# Patient Record
Sex: MALE | Race: WHITE | NOT HISPANIC OR LATINO | Employment: OTHER | URBAN - METROPOLITAN AREA
[De-identification: names, ages, dates, MRNs, and addresses within clinical notes are randomized per-mention and may not be internally consistent; named-entity substitution may affect disease eponyms.]

---

## 2018-03-26 ENCOUNTER — LAB VISIT (OUTPATIENT)
Dept: LAB | Facility: HOSPITAL | Age: 78
End: 2018-03-26
Attending: PSYCHIATRY & NEUROLOGY
Payer: MEDICARE

## 2018-03-26 ENCOUNTER — OFFICE VISIT (OUTPATIENT)
Dept: NEUROLOGY | Facility: CLINIC | Age: 78
End: 2018-03-26
Payer: MEDICARE

## 2018-03-26 VITALS
HEART RATE: 72 BPM | SYSTOLIC BLOOD PRESSURE: 132 MMHG | RESPIRATION RATE: 18 BRPM | WEIGHT: 182.31 LBS | DIASTOLIC BLOOD PRESSURE: 68 MMHG | BODY MASS INDEX: 27.63 KG/M2 | HEIGHT: 68 IN

## 2018-03-26 DIAGNOSIS — E23.6 EMPTY SELLA: ICD-10-CM

## 2018-03-26 DIAGNOSIS — R42 VERTIGO: ICD-10-CM

## 2018-03-26 DIAGNOSIS — I45.10 RIGHT BUNDLE BRANCH BLOCK: ICD-10-CM

## 2018-03-26 DIAGNOSIS — R09.89 UNEQUAL BLOOD PRESSURE IN UPPER EXTREMITIES: ICD-10-CM

## 2018-03-26 DIAGNOSIS — R09.89 UNEQUAL BLOOD PRESSURE IN UPPER EXTREMITIES: Primary | ICD-10-CM

## 2018-03-26 LAB
CREAT SERPL-MCNC: 1.2 MG/DL
EST. GFR  (AFRICAN AMERICAN): >60 ML/MIN/1.73 M^2
EST. GFR  (NON AFRICAN AMERICAN): 59 ML/MIN/1.73 M^2

## 2018-03-26 PROCEDURE — 82565 ASSAY OF CREATININE: CPT

## 2018-03-26 PROCEDURE — 99999 PR PBB SHADOW E&M-EST. PATIENT-LVL III: CPT | Mod: PBBFAC,,, | Performed by: PSYCHIATRY & NEUROLOGY

## 2018-03-26 PROCEDURE — 36415 COLL VENOUS BLD VENIPUNCTURE: CPT

## 2018-03-26 PROCEDURE — 99204 OFFICE O/P NEW MOD 45 MIN: CPT | Mod: S$PBB,,, | Performed by: PSYCHIATRY & NEUROLOGY

## 2018-03-26 RX ORDER — FENOFIBRATE 160 MG/1
160 TABLET ORAL DAILY
COMMUNITY

## 2018-03-26 RX ORDER — MECLIZINE HYDROCHLORIDE 25 MG/1
25 TABLET ORAL 4 TIMES DAILY PRN
COMMUNITY

## 2018-03-26 NOTE — LETTER
March 26, 2018      Sunny Fritz MD  [address]  [facility]  [place] [address]           [facility]  [address] [facility] [address]  Phone: [phone]  Fax: [phone]          Patient: Justin Mcnally   MR Number: [id number]   YOB: 1940   Date of Visit: 3/26/2018       Dear Dr. Sunny Fritz:    Thank you for referring Justin Mcnally to me for evaluation. Attached you will find relevant portions of my assessment and plan of care.    If you have questions, please do not hesitate to call me. I look forward to following Justin Mcnally along with you.    Sincerely,    Ventura Prescott MD    Enclosure  CC:  No Recipients    If you would like to receive this communication electronically, please contact [email or web address] or [phone] to request more information on [employer] Link access.    For providers and/or their staff who would like to refer a patient to [facility], please contact us through our one-stop-shop provider referral line, [facility], at [phone].    If you feel you have received this communication in error or would no longer like to receive these types of communications, please e-mail [email or web address]

## 2018-03-26 NOTE — PROGRESS NOTES
"Consult from Dr Fritz    HPI: Justin Mcnally is a 77 y.o. male referred for abnormal MRI brain. He was noted to have empty sella syndrome  Note in 2/2018 he reported to the ER at [facility] for dizziness and vomiting and MRI was done there. He was having room spinning.   This was thought to be Vertigo of central origin of both ears, but patient was kept in to check MRI/A brain to rule out cerebellar stroke.  He was noted to have blurry vision and nystagmus which is all resolved. He has no headaches. He does not have chronic dizziness  He did well overnight and tolerated po and vertigo resolved the next. He was given meclizine  He reports some variable Blood pressure with checking left to right side- always higher on the right side. He does not see cardiology. He denies any arm weakness or numbness.    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   He drinks 2 beers per day.    Review of Systems   Constitutional: Negative for fever.   HENT: Negative for nosebleeds and tinnitus.    Eyes: Negative for double vision.   Respiratory: Negative for hemoptysis.    Cardiovascular: Negative for chest pain and leg swelling.   Gastrointestinal: Negative for blood in stool.   Genitourinary: Negative for hematuria.   Musculoskeletal: Negative for falls.   Skin: Negative for rash.   Neurological: Negative for seizures.   Psychiatric/Behavioral: The patient does not have insomnia.          I have reviewed all of this patient's past medical and surgical histories as well as family and social histories and active allergies and medications as documented in the electronic medical record.        Exam:  Gen Appearance, well developed/nourished in no apparent distress  CV: 2+ distal pulses with no edema or swelling  Neuro:  MS: Awake, alert, oriented to place, person, time, situation. Sustains attention. Recent/remote memory intact, Language " is full to spontaneous speech/repetition/naming/comprehension. Fund of Knowledge is full  CN: Optic discs are flat with normal vasculature, PERRL, Extraoccular movements and visual fields are full. Normal facial sensation and strength, Hearing symmetric, Tongue and Palate are midline and strong. Shoulder Shrug symmetric and strong.  Motor: Normal bulk, tone, no abnormal movements. 5/5 strength bilateral upper/lower extremities with 1+ reflexes and no clonus  Sensory: symmetric to light touch, pain, temp, and vibration/proprioception. Romberg negative  Cerebellar: Finger-nose,Heal-shin, Rapid alternating movements intact  Gait: Normal stance, no ataxia  TM clear bilaterally    Imaging: MRI brain 2/2018: Empty sella possible, but not acute CVA  Previous craniotomy changes are noted  Tele: no afib  BBB with SR  MRA was not done  Labs: 2/2018 CBC  And BMP unremarkable      Assessment/Plan: Justin Mcnally is a 77 y.o. male who had a spell of vertigo in 2/2018 which resolved within 24 hours. MRI brain shows possible empty sella, and  he report unequal blood pressure in the arms.   I recommend:   1. CTA head and neck needed to look for any subclavian blockage as cause of his unequal blood pressures which could predispose him to subclavian steal  2. Refer to cardiology given these finding and not RBBB by EKG  3. Reviewed: empty sella syndrome is incidentally found. Can be found in obese states or pseudotumor cerebri- he currently has no signs concerning for pseudotumor cerebri  4. For any recurrent dizziness- will refer to PT   5. 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   CC: Dr Fritz

## 2018-03-29 ENCOUNTER — HOSPITAL ENCOUNTER (OUTPATIENT)
Dept: RADIOLOGY | Facility: HOSPITAL | Age: 78
Discharge: HOME | End: 2018-03-29
Attending: PSYCHIATRY & NEUROLOGY
Payer: MEDICARE

## 2018-03-29 DIAGNOSIS — R09.89 UNEQUAL BLOOD PRESSURE IN UPPER EXTREMITIES: ICD-10-CM

## 2018-03-29 DIAGNOSIS — R42 VERTIGO: ICD-10-CM

## 2018-03-29 PROCEDURE — 25500020 PHARM REV CODE 255: Performed by: PSYCHIATRY & NEUROLOGY

## 2018-03-29 PROCEDURE — 70498 CT ANGIOGRAPHY NECK: CPT | Mod: 26,,, | Performed by: RADIOLOGY

## 2018-03-29 PROCEDURE — 70496 CT ANGIOGRAPHY HEAD: CPT | Mod: TC

## 2018-03-29 PROCEDURE — 70496 CT ANGIOGRAPHY HEAD: CPT | Mod: 26,,, | Performed by: RADIOLOGY

## 2018-03-29 RX ADMIN — IOHEXOL 100 ML: 350 INJECTION, SOLUTION INTRAVENOUS at 10:03

## 2023-09-25 ENCOUNTER — HOSPITAL ENCOUNTER (OUTPATIENT)
Dept: RADIOLOGY | Facility: HOSPITAL | Age: 83
Discharge: HOME | End: 2023-09-25
Attending: FAMILY MEDICINE
Payer: MEDICARE

## 2023-09-25 DIAGNOSIS — N63.10 UNSPECIFIED LUMP IN THE RIGHT BREAST, UNSPECIFIED QUADRANT: ICD-10-CM

## 2023-09-25 PROCEDURE — 77065 MAMMO DIGITAL DIAGNOSTIC RIGHT: ICD-10-PCS | Mod: 26,RT,, | Performed by: RADIOLOGY

## 2023-09-25 PROCEDURE — 77065 DX MAMMO INCL CAD UNI: CPT | Mod: 26,RT,, | Performed by: RADIOLOGY

## 2023-09-25 PROCEDURE — 77065 DX MAMMO INCL CAD UNI: CPT | Mod: TC,RT

## 2023-10-18 ENCOUNTER — HOSPITAL ENCOUNTER (OUTPATIENT)
Dept: RADIOLOGY | Facility: HOSPITAL | Age: 83
Discharge: HOME | End: 2023-10-18
Attending: NURSE PRACTITIONER
Payer: MEDICARE

## 2023-10-18 DIAGNOSIS — K22.2 ESOPHAGEAL OBSTRUCTION: ICD-10-CM

## 2023-10-18 DIAGNOSIS — R13.10 DYSPHAGIA: ICD-10-CM

## 2023-10-18 PROCEDURE — 74220 X-RAY XM ESOPHAGUS 1CNTRST: CPT | Mod: 26,,, | Performed by: RADIOLOGY

## 2023-10-18 PROCEDURE — A9698 NON-RAD CONTRAST MATERIALNOC: HCPCS

## 2023-10-18 PROCEDURE — 74220 FL ESOPHAGRAM COMPLETE: ICD-10-PCS | Mod: 26,,, | Performed by: RADIOLOGY

## 2023-10-18 PROCEDURE — 74220 X-RAY XM ESOPHAGUS 1CNTRST: CPT | Mod: TC

## 2023-10-18 PROCEDURE — 25500020 PHARM REV CODE 255

## 2023-10-18 RX ADMIN — BARIUM SULFATE 355 ML: 0.6 SUSPENSION ORAL at 09:10

## 2024-05-29 ENCOUNTER — TELEPHONE (OUTPATIENT)
Dept: OPHTHALMOLOGY | Facility: CLINIC | Age: 84
End: 2024-05-29
Payer: MEDICARE

## 2024-08-13 ENCOUNTER — TELEPHONE (OUTPATIENT)
Dept: OPHTHALMOLOGY | Facility: CLINIC | Age: 84
End: 2024-08-13
Payer: MEDICARE

## 2024-08-13 NOTE — TELEPHONE ENCOUNTER
----- Message from Wilmar Hernandez sent at 8/12/2024  4:00 PM CDT -----  June Godinez sent to [facility] Staff  Caller: Traci with [facility] (Today, 11:39 AM)  CONSULT/ADVISORY    Name of Caller:  Traci with [facility]    Contact Preference: [phone]  Ext [phone]    Nature of Call:  Requesting to see when pt will be scheduled an appt.

## 2024-12-17 NOTE — PROGRESS NOTES
"Date:  12/18/2024    ?  Referring Provider:   Edenilson Thakur MD    Copies of Letters to the Following:   Edenilson Thakur MD    Chief Complaint:  I saw Justin Mcnally at the [facility] for neuro-ophthalmic evaluation.   He is a 84 y.o. male with a history of HTN, HLD, erectile dysfunction, alcohol use who presents for evaluation of suspected optic neuropathy.    History:       HPI    Referred: Dr. Thakur     83 y/o male present to Neuro-ophthalmic clinic for optic atrophy   evaluation. He is accompany by daughter Aura and wife. He reports   concerns being told "he is going blind." He states gradual vision OS>OD.   He does not wear glasses. He has eye allergies and dry eyes. Occasional   headaches and floaters.  No diplopia, migraines, flashes of lights, or eye   pain. Last MRI was last year elsewhere. History of head injury at work.     [employer]  Systane OU PRN   Last edited by Wilmar Hernandez on 12/18/2024 10:03 AM.        ?  Current Outpatient Medications   Medication Sig Dispense Refill    amlodipine-benazepril 10-20mg (LOTREL) 10-20 mg per capsule Take 1 capsule by mouth once daily.       atorvastatin (LIPITOR) 20 MG tablet       fenofibrate 160 MG Tab Take 160 mg by mouth once daily.      meclizine (ANTIVERT) 25 mg tablet Take 25 mg by mouth 4 (four) times daily as needed.      omeprazole (PRILOSEC) 40 MG capsule Take 40 mg by mouth every morning.       sertraline (ZOLOFT) 50 MG tablet Take 50 mg by mouth once daily.       sildenafil (VIAGRA) 100 MG tablet Take 1 tablet (100 mg total) by mouth as needed for Erectile Dysfunction. 3 tablet 6    tamsulosin (FLOMAX) 0.4 mg Cp24 Take 0.4 mg by mouth once daily.        No current facility-administered medications for this visit.     Review of patient's allergies indicates:   Allergen Reactions    Aspirin Nausea And Vomiting     Past Medical History:   Diagnosis Date    Acid reflux     Depression     ED (erectile dysfunction)     Hiatal hernia     " "Hyperlipemia     Hypertension     Prostate enlargement      Past Surgical History:   Procedure Laterality Date    BRAIN SURGERY      "blood clot" removal    CHOLECYSTECTOMY       Family History   Problem Relation Name Age of Onset    Emphysema Father      Heart disease Mother       Social History     Socioeconomic History    Marital status:    Tobacco Use    Smoking status: Former     Current packs/day: 0.00     Types: Cigarettes, Pipe     Quit date: 1970     Years since quittin.9    Smokeless tobacco: Never   Substance and Sexual Activity    Alcohol use: Yes     Alcohol/week: 20.0 standard drinks of alcohol     Types: 24 Standard drinks or equivalent per week     Comment: 1 case a month       Examination:  He was well-appearing. He was alert and oriented. Attention span and concentration were normal. Speech, language, memory, and general knowledge were intact.      His distance visual acuity without correction was 20/30  in the right eye and 20/80  in the left eye.  His near visual acuity without correction was J7 PH J5 in the right eye and 20/400 PH J10 in the left eye.      He perceived 6/8 OD and 0/8 OS Ishihara color plates correctly. Pupils were brisk to light without an afferent defect. Ocular ductions were full. Orthophoric in primary, right, and left gaze by cross cover. There was no nystagmus. Saccades and pursuits were normal. Lids were symmetric.     Optic discs appeared pallorous and flat.  Pupillary dilation was not necessary for visualization of the optic disc today.     His intra ocular pressure was 14 in the right eye and 14 in the left eye at 1010 by applanation.     Laboratories Reviewed:     N/a  ?  Neuroimaging Reviewed:     3/2018 CTA head and neck  Results:     CT head with and without contrast:  There is no evidence for acute intracranial hemorrhage or sulcal effacement.  Ventricles are normal in size and configuration without evidence for hydrocephalus.  There is no midline " shift or mass-effect.  Allowing for limitation by CT technique there is no abnormal parenchymal enhancement.    Visualized paranasal sinuses and mastoid air cells are clear.  Postoperative changes of left frontal craniotomy are noted.     CTA head:  Anterior circulation: The bilateral distal cervical, petrous, cavernous and supraclinoid segments of the ICAs are patent without significant focal stenosis or aneurysm.       Atherosclerotic plaquing of the cavernous segments of the ICAs.  The anterior and middle cerebral arteries are patent without significant focal stenosis or aneurysm.     Posterior circulation: The distal left vertebral artery is dominant.  The distal vertebral arteries, basilar artery and posterior cerebral arteries are patent without significant focal stenosis or aneurysm.       CTA Neck:  Atherosclerotic plaquing of the arch and origin of the great vessels without significant focal stenosis.  There is a bovine configuration the aortic arch.  The origins of the combined trunk and left subclavian arteries from the arch are within normal limits. The origins of the vertebral arteries from the subclavian arteries are within normal limits. The vertebral arteries are unremarkable throughout their course without evidence for focal stenosis or occlusion.       Right carotid: There are calcifications of the carotid bulb and proximal right internal carotid artery.  The right common carotid artery, carotid bifurcation and extracranial portions of the internal carotid artery are patent without evidence for focal stenosis or occlusion.     Left carotid: There are calcifications of the left carotid bulb and proximal left internal carotid artery with homogenous soft plaque in the region of the left mid internal carotid artery.  This results in approximately 50% luminal narrowing.  Otherwise, The left common carotid artery, carotid bifurcation and extra cranial portions of the internal carotid artery are patent  without evidence for focal stenosis or occlusion.     Pharynx/larynx: the nasopharynx, oral pharynx,, hypopharynx, larynx and visualized portion of the trachea are within normal limits.     The oral cavity and buccal space are  limited by artifact from dental metal.     Glands: The bilateral parotid, submandibular and thyroid glands are within normal limits.     No evidence for adenopathy throughout the neck by size criteria.     Multilevel mild to moderate degenerative change of the cervical spine.       Prominent subpleural and centrilobular emphysematous changes are seen in the lung apices.  No significant air space consolidation within the visualized lungs.  IMPRESSION:     Unremarkable CT of the head specifically without evidence for acute hemorrhage or abnormal parenchymal enhancement.     There is calcified plaque involving the bilateral carotid bulbs and intracranial carotid arteries.  There is soft plaque in the region of the left mid ICA resulting in at least 50% stenosis.  No significant stenosis of the right ICA.  No aneurysm is seen.       The bilateral subclavian arteries are patent.  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  ?  Ocular Imaging, Photos, Records Reviewed:     OCT RNFL Today 12/18/2024:   Right Eye - Average RNFL 43 global thinning with relative temporal sparing   Left Eye - Average RNFL 42 global thinning     Normal macular architecture OU    Visual Field Test 24-2 OU Today 12/18/2024: Right Eye - fixation losses 0/13, false positives 1%, false negatives 15%, MD -20.50dB, Impression OD: dense constriction with relative ST and central sparing. Left Eye - fixation losses 0/14, false positives 0%, false negatives 7%, MD -21.66dB, Impression OS: patchy severe depression with some central sparing.  ?  Impression:  Justin Mcnally has history of HTN, HLD, erectile dysfunction, alcohol use who presents for  evaluation of suspected optic neuropathy. They report slowly progressive blurred vision and especially difficulty with driving at night with glare. He had severe head trauma with subdural evacuation 30 years ago with no subjective changes in vision at that time. Neuro-ophthalmologic examination was notable for reduced visual acuities, impaired color vision OS>>OD, normal ocular motility and alignment. OCT with global peripapillary RNFL thinning OS<OD. Formal visual fields were notable for patchy constriction OS>OD with some central sparing. The pattern of visual field change is not very consistent with a nutritional or toxic optic neuropathy (should be more central) and would expect pattern of thinning to be more PMB. He thinks he had MRI in the last year or so but can't remember if it was of brain or contrast enhanced. He had CTA head and neck in 2018 with about 50% stenosis left ICA. I will repeat neuroimaging and vessel imaging now and send optic neuropathy labs. 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  ?  Plan:  1. Optic neuropathy labs  2. CTA head and neck (will review any outside MRI if it can be made available)  3. Follow up with Dr. Thakur as planned    Follow-up:  I will see him in follow-up in 3-4 months or sooner with any change.  ?OCT and HVF  ?  Visit Checklist (as applicable):  1. Status of new and prior symptoms discussed? yes  2. Neuroimaging reviewed/ ordered as appropriate? yes  3. Ocular imaging and photos reviewed/ ordered as appropriate? yes  4. Plan for work-up and treatment discussed with patient? yes  5. Potential medication side-effects and monitoring plan discussed? N/a  6. Review of outside medical records was performed and pertinent details are summarized in the HPI above? yes    Time spent on this encounter: 60 minutes. This includes  face to face time and non-face to face time preparing to see the patient (eg, review of tests), obtaining and/or reviewing separately obtained history, documenting clinical information in the electronic or other health record, independently interpreting results and communicating results to the patient/family/caregiver, or care coordinator.      RA Palma  Neuro-Ophthalmology Consultant

## 2024-12-18 ENCOUNTER — LAB VISIT (OUTPATIENT)
Dept: LAB | Facility: HOSPITAL | Age: 84
End: 2024-12-18
Attending: STUDENT IN AN ORGANIZED HEALTH CARE EDUCATION/TRAINING PROGRAM
Payer: MEDICARE

## 2024-12-18 ENCOUNTER — CLINICAL SUPPORT (OUTPATIENT)
Dept: OPHTHALMOLOGY | Facility: CLINIC | Age: 84
End: 2024-12-18
Payer: MEDICARE

## 2024-12-18 ENCOUNTER — OFFICE VISIT (OUTPATIENT)
Dept: OPHTHALMOLOGY | Facility: CLINIC | Age: 84
End: 2024-12-18
Payer: MEDICARE

## 2024-12-18 DIAGNOSIS — G60.9 HEREDITARY AND IDIOPATHIC NEUROPATHY, UNSPECIFIED: ICD-10-CM

## 2024-12-18 DIAGNOSIS — G60.3 IDIOPATHIC PROGRESSIVE NEUROPATHY: ICD-10-CM

## 2024-12-18 DIAGNOSIS — H54.7 UNSPECIFIED VISUAL LOSS: ICD-10-CM

## 2024-12-18 DIAGNOSIS — R29.90 UNSPECIFIED SYMPTOMS AND SIGNS INVOLVING THE NERVOUS SYSTEM: ICD-10-CM

## 2024-12-18 DIAGNOSIS — H53.15 VISUAL DISTORTIONS OF SHAPE AND SIZE: Primary | ICD-10-CM

## 2024-12-18 DIAGNOSIS — H51.9 UNSPECIFIED DISORDER OF BINOCULAR MOVEMENT: ICD-10-CM

## 2024-12-18 LAB
ALBUMIN SERPL BCP-MCNC: 4.1 G/DL (ref 3.5–5.2)
ALP SERPL-CCNC: 64 U/L (ref 40–150)
ALT SERPL W/O P-5'-P-CCNC: 20 U/L (ref 10–44)
ANION GAP SERPL CALC-SCNC: 9 MMOL/L (ref 8–16)
AST SERPL-CCNC: 17 U/L (ref 10–40)
BASOPHILS # BLD AUTO: 0.03 K/UL (ref 0–0.2)
BASOPHILS NFR BLD: 0.5 % (ref 0–1.9)
BILIRUB SERPL-MCNC: 0.4 MG/DL (ref 0.1–1)
BUN SERPL-MCNC: 23 MG/DL (ref 8–23)
CALCIUM SERPL-MCNC: 9.7 MG/DL (ref 8.7–10.5)
CHLORIDE SERPL-SCNC: 104 MMOL/L (ref 95–110)
CO2 SERPL-SCNC: 27 MMOL/L (ref 23–29)
CREAT SERPL-MCNC: 1.3 MG/DL (ref 0.5–1.4)
DIFFERENTIAL METHOD BLD: NORMAL
EOSINOPHIL # BLD AUTO: 0.1 K/UL (ref 0–0.5)
EOSINOPHIL NFR BLD: 2.2 % (ref 0–8)
ERYTHROCYTE [DISTWIDTH] IN BLOOD BY AUTOMATED COUNT: 14 % (ref 11.5–14.5)
EST. GFR  (NO RACE VARIABLE): 54.8 ML/MIN/1.73 M^2
GLUCOSE SERPL-MCNC: 96 MG/DL (ref 70–110)
HCT VFR BLD AUTO: 45.6 % (ref 40–54)
HGB BLD-MCNC: 15.7 G/DL (ref 14–18)
HIV 1+2 AB+HIV1 P24 AG SERPL QL IA: NORMAL
IMM GRANULOCYTES # BLD AUTO: 0.03 K/UL (ref 0–0.04)
IMM GRANULOCYTES NFR BLD AUTO: 0.5 % (ref 0–0.5)
LYMPHOCYTES # BLD AUTO: 1.4 K/UL (ref 1–4.8)
LYMPHOCYTES NFR BLD: 21.8 % (ref 18–48)
MCH RBC QN AUTO: 29.7 PG (ref 27–31)
MCHC RBC AUTO-ENTMCNC: 34.4 G/DL (ref 32–36)
MCV RBC AUTO: 86 FL (ref 82–98)
MONOCYTES # BLD AUTO: 0.4 K/UL (ref 0.3–1)
MONOCYTES NFR BLD: 6.1 % (ref 4–15)
NEUTROPHILS # BLD AUTO: 4.4 K/UL (ref 1.8–7.7)
NEUTROPHILS NFR BLD: 68.9 % (ref 38–73)
NRBC BLD-RTO: 0 /100 WBC
PLATELET # BLD AUTO: 210 K/UL (ref 150–450)
PMV BLD AUTO: 11.7 FL (ref 9.2–12.9)
POTASSIUM SERPL-SCNC: 3.8 MMOL/L (ref 3.5–5.1)
PROT SERPL-MCNC: 7.5 G/DL (ref 6–8.4)
RBC # BLD AUTO: 5.28 M/UL (ref 4.6–6.2)
SODIUM SERPL-SCNC: 140 MMOL/L (ref 136–145)
VIT B12 SERPL-MCNC: 334 PG/ML (ref 210–950)
WBC # BLD AUTO: 6.42 K/UL (ref 3.9–12.7)

## 2024-12-18 PROCEDURE — 80053 COMPREHEN METABOLIC PANEL: CPT | Performed by: STUDENT IN AN ORGANIZED HEALTH CARE EDUCATION/TRAINING PROGRAM

## 2024-12-18 PROCEDURE — 86036 ANCA SCREEN EACH ANTIBODY: CPT | Performed by: STUDENT IN AN ORGANIZED HEALTH CARE EDUCATION/TRAINING PROGRAM

## 2024-12-18 PROCEDURE — 86038 ANTINUCLEAR ANTIBODIES: CPT | Performed by: STUDENT IN AN ORGANIZED HEALTH CARE EDUCATION/TRAINING PROGRAM

## 2024-12-18 PROCEDURE — 84425 ASSAY OF VITAMIN B-1: CPT | Performed by: STUDENT IN AN ORGANIZED HEALTH CARE EDUCATION/TRAINING PROGRAM

## 2024-12-18 PROCEDURE — 86363 MOG-IGG1 ANTB FLO CYTMTRY EA: CPT | Performed by: STUDENT IN AN ORGANIZED HEALTH CARE EDUCATION/TRAINING PROGRAM

## 2024-12-18 PROCEDURE — 86235 NUCLEAR ANTIGEN ANTIBODY: CPT | Performed by: STUDENT IN AN ORGANIZED HEALTH CARE EDUCATION/TRAINING PROGRAM

## 2024-12-18 PROCEDURE — 99999 PR PBB SHADOW E&M-EST. PATIENT-LVL II: CPT | Mod: PBBFAC,,, | Performed by: STUDENT IN AN ORGANIZED HEALTH CARE EDUCATION/TRAINING PROGRAM

## 2024-12-18 PROCEDURE — 3288F FALL RISK ASSESSMENT DOCD: CPT | Mod: CPTII,S$GLB,, | Performed by: STUDENT IN AN ORGANIZED HEALTH CARE EDUCATION/TRAINING PROGRAM

## 2024-12-18 PROCEDURE — 82164 ANGIOTENSIN I ENZYME TEST: CPT | Performed by: STUDENT IN AN ORGANIZED HEALTH CARE EDUCATION/TRAINING PROGRAM

## 2024-12-18 PROCEDURE — 82787 IGG 1 2 3 OR 4 EACH: CPT | Performed by: STUDENT IN AN ORGANIZED HEALTH CARE EDUCATION/TRAINING PROGRAM

## 2024-12-18 PROCEDURE — 1160F RVW MEDS BY RX/DR IN RCRD: CPT | Mod: CPTII,S$GLB,, | Performed by: STUDENT IN AN ORGANIZED HEALTH CARE EDUCATION/TRAINING PROGRAM

## 2024-12-18 PROCEDURE — 1126F AMNT PAIN NOTED NONE PRSNT: CPT | Mod: CPTII,S$GLB,, | Performed by: STUDENT IN AN ORGANIZED HEALTH CARE EDUCATION/TRAINING PROGRAM

## 2024-12-18 PROCEDURE — 86480 TB TEST CELL IMMUN MEASURE: CPT | Performed by: STUDENT IN AN ORGANIZED HEALTH CARE EDUCATION/TRAINING PROGRAM

## 2024-12-18 PROCEDURE — 1101F PT FALLS ASSESS-DOCD LE1/YR: CPT | Mod: CPTII,S$GLB,, | Performed by: STUDENT IN AN ORGANIZED HEALTH CARE EDUCATION/TRAINING PROGRAM

## 2024-12-18 PROCEDURE — 84207 ASSAY OF VITAMIN B-6: CPT | Performed by: STUDENT IN AN ORGANIZED HEALTH CARE EDUCATION/TRAINING PROGRAM

## 2024-12-18 PROCEDURE — 92133 CPTRZD OPH DX IMG PST SGM ON: CPT | Mod: S$GLB,,, | Performed by: STUDENT IN AN ORGANIZED HEALTH CARE EDUCATION/TRAINING PROGRAM

## 2024-12-18 PROCEDURE — 87389 HIV-1 AG W/HIV-1&-2 AB AG IA: CPT | Performed by: STUDENT IN AN ORGANIZED HEALTH CARE EDUCATION/TRAINING PROGRAM

## 2024-12-18 PROCEDURE — 86235 NUCLEAR ANTIGEN ANTIBODY: CPT | Mod: 59 | Performed by: STUDENT IN AN ORGANIZED HEALTH CARE EDUCATION/TRAINING PROGRAM

## 2024-12-18 PROCEDURE — 1159F MED LIST DOCD IN RCRD: CPT | Mod: CPTII,S$GLB,, | Performed by: STUDENT IN AN ORGANIZED HEALTH CARE EDUCATION/TRAINING PROGRAM

## 2024-12-18 PROCEDURE — 99205 OFFICE O/P NEW HI 60 MIN: CPT | Mod: S$GLB,,, | Performed by: STUDENT IN AN ORGANIZED HEALTH CARE EDUCATION/TRAINING PROGRAM

## 2024-12-18 PROCEDURE — 85025 COMPLETE CBC W/AUTO DIFF WBC: CPT | Performed by: STUDENT IN AN ORGANIZED HEALTH CARE EDUCATION/TRAINING PROGRAM

## 2024-12-18 PROCEDURE — 82607 VITAMIN B-12: CPT | Performed by: STUDENT IN AN ORGANIZED HEALTH CARE EDUCATION/TRAINING PROGRAM

## 2024-12-18 PROCEDURE — 92083 EXTENDED VISUAL FIELD XM: CPT | Mod: S$GLB,,, | Performed by: STUDENT IN AN ORGANIZED HEALTH CARE EDUCATION/TRAINING PROGRAM

## 2024-12-18 NOTE — LETTER
[facility] - [facility]  [address]  [place] [address]  Phone: [phone]  Fax: [phone]   December 18, 2024    Edenilson Thakur MD  [address] [place] [address]    Patient: Justin Mcnally   MR Number: [id number]   YOB: 1940   Date of Visit: 12/18/2024       Dear Dr. Thakur :    Thank you for referring Justin Mcnally to me for evaluation. Here is my assessment and plan of care:    Impression:  Justin Mcnally has history of HTN, HLD, erectile dysfunction, alcohol use who presents for evaluation of suspected optic neuropathy. They report slowly progressive blurred vision and especially difficulty with driving at night with glare. He had severe head trauma with subdural evacuation 30 years ago with no subjective changes in vision at that time. Neuro-ophthalmologic examination was notable for reduced visual acuities, impaired color vision OS>>OD, normal ocular motility and alignment. OCT with global peripapillary RNFL thinning OS<OD. Formal visual fields were notable for patchy constriction OS>OD with some central sparing. The pattern of visual field change is not very consistent with a nutritional or toxic optic neuropathy (should be more central) and would expect pattern of thinning to be more PMB. He thinks he had MRI in the last year or so but can't remember if it was of brain or contrast enhanced. He had CTA head and neck in 2018 with about 50% stenosis left ICA. I will repeat neuroimaging and vessel imaging now and send optic neuropathy labs. 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     Plan:  1. Optic neuropathy labs  2. CTA head and neck (will review any outside MRI if it can be made available)  3. Follow up with Dr. Thakur as planned    Follow-up:  I will see him in follow-up in 3-4 months or sooner with any  change.    If you have questions, please do not hesitate to call me. I look forward to following . Justin Mcnally along with you.    Sincerely,        Bibiana Mcclellan MD       CC  No Recipients

## 2024-12-19 LAB
ACE SERPL-CCNC: <5 U/L (ref 16–85)
ANA SER QL IF: NORMAL
ANTI-SSA ANTIBODY: 0.09 RATIO (ref 0–0.99)
ANTI-SSA INTERPRETATION: NEGATIVE
ANTI-SSB ANTIBODY: 0.09 RATIO (ref 0–0.99)
ANTI-SSB INTERPRETATION: NEGATIVE
GAMMA INTERFERON BACKGROUND BLD IA-ACNC: 0.03 IU/ML
M TB IFN-G CD4+ BCKGRND COR BLD-ACNC: 0.08 IU/ML
M TB IFN-G CD4+ BCKGRND COR BLD-ACNC: 0.11 IU/ML
MITOGEN IGNF BCKGRD COR BLD-ACNC: 9.97 IU/ML
TB GOLD PLUS: NEGATIVE

## 2024-12-23 LAB
ANCA AB TITR SER IF: NORMAL TITER
CNS DEMYELINATING DISEASE EVAL: NORMAL
IGG4 SER-MCNC: 19 MG/DL (ref 4–86)
MOG-IGG1: NEGATIVE
NMO/AQP4 FACS,S: NEGATIVE
P-ANCA TITR SER IF: NORMAL TITER

## 2024-12-24 LAB
PYRIDOXAL SERPL-MCNC: 10 UG/L (ref 5–50)
VIT B1 BLD-MCNC: 85 UG/L (ref 38–122)

## 2025-01-14 ENCOUNTER — TELEPHONE (OUTPATIENT)
Dept: OPTOMETRY | Facility: CLINIC | Age: 85
End: 2025-01-14
Payer: MEDICARE

## 2025-01-14 ENCOUNTER — HOSPITAL ENCOUNTER (OUTPATIENT)
Dept: RADIOLOGY | Facility: HOSPITAL | Age: 85
Discharge: HOME | End: 2025-01-14
Attending: STUDENT IN AN ORGANIZED HEALTH CARE EDUCATION/TRAINING PROGRAM
Payer: MEDICARE

## 2025-01-14 DIAGNOSIS — H54.7 UNSPECIFIED VISUAL LOSS: ICD-10-CM

## 2025-01-14 DIAGNOSIS — H51.9 UNSPECIFIED DISORDER OF BINOCULAR MOVEMENT: ICD-10-CM

## 2025-01-14 PROCEDURE — 25500020 PHARM REV CODE 255: Performed by: STUDENT IN AN ORGANIZED HEALTH CARE EDUCATION/TRAINING PROGRAM

## 2025-01-14 PROCEDURE — 70496 CT ANGIOGRAPHY HEAD: CPT | Mod: TC

## 2025-01-14 PROCEDURE — 70496 CT ANGIOGRAPHY HEAD: CPT | Mod: 26,,, | Performed by: RADIOLOGY

## 2025-01-14 PROCEDURE — 70498 CT ANGIOGRAPHY NECK: CPT | Mod: 26,,, | Performed by: RADIOLOGY

## 2025-01-14 RX ADMIN — IOHEXOL 100 ML: 350 INJECTION, SOLUTION INTRAVENOUS at 08:01

## 2025-01-14 NOTE — TELEPHONE ENCOUNTER
Spoke with pt to inform him hat his CTA of the head and neck appeared stable from prior scan and that that's good news and not concerning.

## 2025-03-26 ENCOUNTER — TELEPHONE (OUTPATIENT)
Dept: OPHTHALMOLOGY | Facility: CLINIC | Age: 85
End: 2025-03-26
Payer: MEDICARE

## 2025-03-26 NOTE — TELEPHONE ENCOUNTER
Spoke with Dr. Jaffe office's that I faxed over last clinical notes, they are also requesting Labs results. I explained that pt need to sign a medial release for labs due to HIPAA, but has HIV and other labs that I am not releasing without pt's approval. I also stated that pt can send labs results to the office if they're on the portal.     Shanika from Dr. Valenzuela calling regarding Patient Advice (message) for *is calling to speak with someone in office concerning what is needed  for pt and blood work, asking for call back

## 2025-03-26 NOTE — TELEPHONE ENCOUNTER
----- Message from Desire sent at 3/25/2025  3:16 PM CDT -----  Contact: [phone] [facility] or [phone]  Shanika from Dr. Valenzuela calling regarding Patient Advice (message) for #is calling to speak with someone in office concerning what is needed  for pt and blood work, asking for call back  ----- Message -----  From: Desire Diaz  Sent: 3/25/2025   3:24 PM CDT  To: Eleuterio Voss from Dr. Valenzuela calling regarding Patient Advice (message) for #is calling to speak with someone in office concerning what is needed  for pt and blood work, asking for call back

## 2025-04-23 ENCOUNTER — OFFICE VISIT (OUTPATIENT)
Dept: OPHTHALMOLOGY | Facility: CLINIC | Age: 85
End: 2025-04-23
Payer: MEDICARE

## 2025-04-23 ENCOUNTER — CLINICAL SUPPORT (OUTPATIENT)
Dept: OPHTHALMOLOGY | Facility: CLINIC | Age: 85
End: 2025-04-23
Payer: MEDICARE

## 2025-04-23 DIAGNOSIS — H53.15 VISUAL DISTORTIONS OF SHAPE AND SIZE: Primary | ICD-10-CM

## 2025-04-23 PROCEDURE — 3288F FALL RISK ASSESSMENT DOCD: CPT | Mod: CPTII,S$GLB,, | Performed by: STUDENT IN AN ORGANIZED HEALTH CARE EDUCATION/TRAINING PROGRAM

## 2025-04-23 PROCEDURE — 1126F AMNT PAIN NOTED NONE PRSNT: CPT | Mod: CPTII,S$GLB,, | Performed by: STUDENT IN AN ORGANIZED HEALTH CARE EDUCATION/TRAINING PROGRAM

## 2025-04-23 PROCEDURE — 1160F RVW MEDS BY RX/DR IN RCRD: CPT | Mod: CPTII,S$GLB,, | Performed by: STUDENT IN AN ORGANIZED HEALTH CARE EDUCATION/TRAINING PROGRAM

## 2025-04-23 PROCEDURE — 1159F MED LIST DOCD IN RCRD: CPT | Mod: CPTII,S$GLB,, | Performed by: STUDENT IN AN ORGANIZED HEALTH CARE EDUCATION/TRAINING PROGRAM

## 2025-04-23 PROCEDURE — 99215 OFFICE O/P EST HI 40 MIN: CPT | Mod: S$GLB,,, | Performed by: STUDENT IN AN ORGANIZED HEALTH CARE EDUCATION/TRAINING PROGRAM

## 2025-04-23 PROCEDURE — 1101F PT FALLS ASSESS-DOCD LE1/YR: CPT | Mod: CPTII,S$GLB,, | Performed by: STUDENT IN AN ORGANIZED HEALTH CARE EDUCATION/TRAINING PROGRAM

## 2025-04-23 PROCEDURE — 99999 PR PBB SHADOW E&M-EST. PATIENT-LVL II: CPT | Mod: PBBFAC,,, | Performed by: STUDENT IN AN ORGANIZED HEALTH CARE EDUCATION/TRAINING PROGRAM

## 2025-04-23 PROCEDURE — 92133 CPTRZD OPH DX IMG PST SGM ON: CPT | Mod: S$GLB,,, | Performed by: STUDENT IN AN ORGANIZED HEALTH CARE EDUCATION/TRAINING PROGRAM

## 2025-04-23 PROCEDURE — 92083 EXTENDED VISUAL FIELD XM: CPT | Mod: S$GLB,,, | Performed by: STUDENT IN AN ORGANIZED HEALTH CARE EDUCATION/TRAINING PROGRAM

## 2025-04-23 NOTE — PROGRESS NOTES
"Date:  4/23/2025    ?  Referring Provider:   No ref. provider found    Copies of Letters to the Following:   No ref. provider found    Chief Complaint:  I saw Justin Mcnally at the [facility] for neuro-ophthalmic evaluation.   He is a 84 y.o. male with a history of HTN, HLD, erectile dysfunction, alcohol use who presents for follow up of optic neuropathy.    History:       HPI    Referred: Dr. Thakur     85 y/o male present to Neuro-ophthalmic clinic for optic atrophy   evaluation. He is accompany by daughter Aura and wife. He reports   concerns being told "he is going blind." He states gradual vision OS>OD.   He does not wear glasses. He has eye allergies and dry eyes. Occasional   headaches and floaters.  No diplopia, migraines, flashes of lights, or eye   pain. Last MRI was last year elsewhere. History of head injury at work.     Eyemeds  Systane OU PRN   Last edited by Wilmar Hernandez on 12/18/2024 10:03 AM.      Interval History: 4/23/2025    HPI    DSL- 12/18/2024     85 y/o male present to clinic for Visual distortions f/u along with   testing. He present to clinic with concern of "blurry" vision of both eyes   since last visit. He had Cataract extraction, left eye x 1 month ago. He   has difficulty with driving at night with glare. He has dry eyes.   Occasional floaters and flashes of lights. No headaches/migraines   reported.     Eyemeds  Thera OU PRN  Pred QD OS  Last edited by Wilmar Hernandez on 4/23/2025 11:15 AM.            ?  Current Outpatient Medications   Medication Sig Dispense Refill    amlodipine-benazepril 10-20mg (LOTREL) 10-20 mg per capsule Take 1 capsule by mouth once daily.       atorvastatin (LIPITOR) 20 MG tablet       fenofibrate 160 MG Tab Take 160 mg by mouth once daily.      meclizine (ANTIVERT) 25 mg tablet Take 25 mg by mouth 4 (four) times daily as needed.      omeprazole (PRILOSEC) 40 MG capsule Take 40 mg by mouth every morning.       sertraline " "(ZOLOFT) 50 MG tablet Take 50 mg by mouth once daily.       sildenafil (VIAGRA) 100 MG tablet Take 1 tablet (100 mg total) by mouth as needed for Erectile Dysfunction. 3 tablet 6    tamsulosin (FLOMAX) 0.4 mg Cp24 Take 0.4 mg by mouth once daily.        No current facility-administered medications for this visit.     Review of patient's allergies indicates:   Allergen Reactions    Aspirin Nausea And Vomiting     Past Medical History:   Diagnosis Date    Acid reflux     Depression     ED (erectile dysfunction)     Hiatal hernia     Hyperlipemia     Hypertension     Prostate enlargement      Past Surgical History:   Procedure Laterality Date    BRAIN SURGERY      "blood clot" removal    CHOLECYSTECTOMY       Family History   Problem Relation Name Age of Onset    Emphysema Father      Heart disease Mother       Social History     Socioeconomic History    Marital status:    Tobacco Use    Smoking status: Former     Current packs/day: 0.00     Types: Cigarettes, Pipe     Quit date: 1970     Years since quittin.2    Smokeless tobacco: Never   Substance and Sexual Activity    Alcohol use: Yes     Alcohol/week: 20.0 standard drinks of alcohol     Types: 24 Standard drinks or equivalent per week     Comment: 1 case a month       Examination:  He was well-appearing. He was alert and oriented. Attention span and concentration were normal. Speech, language, memory, and general knowledge were intact.      His distance visual acuity without correction was 20/50  (PH 20/30) in the right eye and 20/150  (refracts to  20/80, stable from prior) in the left eye.  His near visual acuity without correction was J10 PH J in the right eye and J16 PH NI in the left eye.      He perceived 7/8 OD and 0/8 OS Ishihara color plates correctly. Pupils were brisk to light without an afferent defect. Ocular ductions were full. Orthophoric in primary, right, and left gaze by cross cover. There was no nystagmus. Saccades and pursuits " were normal. Lids were symmetric.     Optic discs appeared pallorous and flat.  Pupillary dilation was not necessary for visualization of the optic disc today.       Laboratories Reviewed:      Latest Reference Range & Units 12/18/24 10:57   WBC 3.90 - 12.70 K/uL 6.42   RBC 4.60 - 6.20 M/uL 5.28   Hemoglobin 14.0 - 18.0 g/dL 15.7   Hematocrit 40.0 - 54.0 % 45.6   MCV 82 - 98 fL 86   MCH 27.0 - 31.0 pg 29.7   MCHC 32.0 - 36.0 g/dL 34.4   RDW 11.5 - 14.5 % 14.0   Platelet Count 150 - 450 K/uL 210   MPV 9.2 - 12.9 fL 11.7   Gran % 38.0 - 73.0 % 68.9   Lymph % 18.0 - 48.0 % 21.8   Mono % 4.0 - 15.0 % 6.1   Eos % 0.0 - 8.0 % 2.2   Basophil % 0.0 - 1.9 % 0.5   Immature Granulocytes 0.0 - 0.5 % 0.5   Gran # (ANC) 1.8 - 7.7 K/uL 4.4   Lymph # 1.0 - 4.8 K/uL 1.4   Mono # 0.3 - 1.0 K/uL 0.4   Eos # 0.0 - 0.5 K/uL 0.1   Baso # 0.00 - 0.20 K/uL 0.03   Immature Grans (Abs) 0.00 - 0.04 K/uL 0.03   nRBC 0 /100 WBC 0   Differential Method  Automated   Vitamin B12 210 - 950 pg/mL 334   Sodium 136 - 145 mmol/L 140   Potassium 3.5 - 5.1 mmol/L 3.8   Chloride 95 - 110 mmol/L 104   CO2 23 - 29 mmol/L 27   Anion Gap 8 - 16 mmol/L 9   BUN 8 - 23 mg/dL 23   Creatinine 0.5 - 1.4 mg/dL 1.3   eGFR >60 mL/min/1.73 m^2 54.8 !   Glucose 70 - 110 mg/dL 96   Calcium 8.7 - 10.5 mg/dL 9.7   ALP 40 - 150 U/L 64   PROTEIN TOTAL 6.0 - 8.4 g/dL 7.5   Albumin 3.5 - 5.2 g/dL 4.1   BILIRUBIN TOTAL 0.1 - 1.0 mg/dL 0.4   AST 10 - 40 U/L 17   ALT 10 - 44 U/L 20   Angio Convert Enzyme 16 - 85 U/L <5 (L)   Thiamine 38 - 122 ug/L 85   Vitamin B6 5 - 50 ug/L 10   ANAHI Screen Negative <1:80  Negative <1:80   Anti-SSA Antibody 0.00 - 0.99 Ratio 0.09   Anti-SSA Interpretation Negative  Negative   Anti-SSB Antibody 0.00 - 0.99 Ratio 0.09   Anti-SSB Interpretation Negative  Negative   CNS Demyelinating Disease Eval  SEE BELOW   Cytoplasmic Neutrophilic Ab <1:20 Titer <1:20   MOG-IgG1 Negative  Negative   Perinuclear (P-ANCA) <1:20 Titer <1:20   IgG 4 4 - 86 mg/dL 19    NMO/AQP4 FACS,S Negative  Negative   TB Gold Plus Negative  Negative   NIL IU/mL 0.44205   TB1 - Nil IU/mL 0.079   TB2 - Nil IU/mL 0.112   Mitogen - Nil IU/mL 9.966   HIV 1/2 Ag/Ab Non-reactive  Non-reactive   !: Data is abnormal  (L): Data is abnormally low  ?  Neuroimaging Reviewed:     3/2018 CTA head and neck  Results:     CT head with and without contrast:  There is no evidence for acute intracranial hemorrhage or sulcal effacement.  Ventricles are normal in size and configuration without evidence for hydrocephalus.  There is no midline shift or mass-effect.  Allowing for limitation by CT technique there is no abnormal parenchymal enhancement.    Visualized paranasal sinuses and mastoid air cells are clear.  Postoperative changes of left frontal craniotomy are noted.     CTA head:  Anterior circulation: The bilateral distal cervical, petrous, cavernous and supraclinoid segments of the ICAs are patent without significant focal stenosis or aneurysm.       Atherosclerotic plaquing of the cavernous segments of the ICAs.  The anterior and middle cerebral arteries are patent without significant focal stenosis or aneurysm.     Posterior circulation: The distal left vertebral artery is dominant.  The distal vertebral arteries, basilar artery and posterior cerebral arteries are patent without significant focal stenosis or aneurysm.       CTA Neck:  Atherosclerotic plaquing of the arch and origin of the great vessels without significant focal stenosis.  There is a bovine configuration the aortic arch.  The origins of the combined trunk and left subclavian arteries from the arch are within normal limits. The origins of the vertebral arteries from the subclavian arteries are within normal limits. The vertebral arteries are unremarkable throughout their course without evidence for focal stenosis or occlusion.       Right carotid: There are calcifications of the carotid bulb and proximal right internal carotid artery.  The  right common carotid artery, carotid bifurcation and extracranial portions of the internal carotid artery are patent without evidence for focal stenosis or occlusion.     Left carotid: There are calcifications of the left carotid bulb and proximal left internal carotid artery with homogenous soft plaque in the region of the left mid internal carotid artery.  This results in approximately 50% luminal narrowing.  Otherwise, The left common carotid artery, carotid bifurcation and extra cranial portions of the internal carotid artery are patent without evidence for focal stenosis or occlusion.     Pharynx/larynx: the nasopharynx, oral pharynx,, hypopharynx, larynx and visualized portion of the trachea are within normal limits.     The oral cavity and buccal space are  limited by artifact from dental metal.     Glands: The bilateral parotid, submandibular and thyroid glands are within normal limits.     No evidence for adenopathy throughout the neck by size criteria.     Multilevel mild to moderate degenerative change of the cervical spine.       Prominent subpleural and centrilobular emphysematous changes are seen in the lung apices.  No significant air space consolidation within the visualized lungs.  IMPRESSION:     Unremarkable CT of the head specifically without evidence for acute hemorrhage or abnormal parenchymal enhancement.     There is calcified plaque involving the bilateral carotid bulbs and intracranial carotid arteries.  There is soft plaque in the region of the left mid ICA resulting in at least 50% stenosis.  No significant stenosis of the right ICA.  No aneurysm is seen.       The bilateral subclavian arteries are patent.  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    1/14/2025 CTA head and neck  CT head with and  without contrast: There is no evidence for acute intracranial hemorrhage or sulcal effacement.  Remote operative  change from left temporoparietal craniotomy.  The ventricles are stable in size and configuration without evidence for hydrocephalus.  There is no midline shift or mass effect.  Allowing for CT technique there is no abnormal parenchymal enhancement.  The visualized paranasal sinuses and mastoid air cells are clear.  Continued partially empty sella     CTA head:     Anterior circulation: The bilateral distal cervical, petrous, cavernous, and supraclinoid segments of the ICAs are patent without significant focal stenosis or aneurysm.  There is atherosclerotic plaquing cavernous segments of the ICAs without significant focal stenosis.  Stable 1-2 mm outpouching left communicating ICA compatible with infundibulum.     The anterior and middle cerebral arteries are patent without focal stenosis or aneurysm.     Posterior circulation: Distal left vertebral artery dominant.  Distal vertebral arteries, basilar artery posterior cerebral arteries are patent without significant focal stenosis or aneurysm..     CTA neck: Atherosclerotic plaquing of the arch and origin great vessels without significant focal stenosis.  Common origin the right brachiocephalic and left common carotid artery and origin of the left subclavian artery from the arch are patent without significant stenosis allowing for atherosclerotic plaquing     Origin right vertebral artery from the right subclavian artery within normal limits.  Left vertebral artery origin with slight atherosclerotic calcified noncalcified plaquing with mild narrowing.  The left vertebral artery is dominant.  Vertebral arteries patent throughout their course..     Right carotid: The right common carotid artery, carotid bifurcation and extracranial portions of the internal carotid artery are patent with atherosclerotic plaquing carotid bifurcation proximal ICA with less than 50% proximal ICA stenosis by NASCET criteria.     Left carotid: The left common carotid artery, carotid  bifurcation and extracranial portions of the internal carotid arteries are patent with atherosclerotic plaquing carotid bifurcation proximal ICA with less than 50% proximal ICA stenosis by NASCET criteria..     Pharynx/larynx: Evaluation distorted by scatter artifact from motion allowing for artifacts no definite focal lesion throughout the pharynx/larynx.     Glands: No focal parotid or submandibular gland lesion.  Few heterogeneous subcentimeter nodules within the thyroid largest on the right measuring approximately 0.4 cm..     No evidence for adenopathy throughout the neck by size criteria.  There is a prevascular lymph node measuring approximately 1 cm image 59 series 5, clinical correlation and further evaluation dedicated CT thorax advised.  Emphysematous change visualized lungs.     Degenerative change cervical spine without evidence for acute fracture.  There is trace 1 anterolisthesis of C6 on C7.     Impression:     CTA head: Unremarkable CTA of the head specifically without evidence for proximal significant stenosis or proximal large vessel occlusion.     Stable 1-2 mm outpouching left communicating ICA most compatible with PCOM infundibulum.     CTA neck: Atherosclerotic disease in the origins of the left vertebral artery from the left subclavian artery with mild narrowing     Atherosclerotic disease of the carotid bifurcations and proximal ICAs with less than 50% proximal ICA stenosis by NASCET criteria.     1 cm prevascular lymph node with emphysematous change in the visualized lungs     Clinical correlation and further evaluation with CT thorax advised.     CT head: No evidence for acute intracranial hemorrhage or definite abnormal parenchymal enhancement.     Clinical correlation and further evaluation with MRI as warranted if patient compatible.  ?  Ocular Imaging, Photos, Records Reviewed:     OCT RNFL 12/18/2024:   Right Eye - Average RNFL 43 global thinning with relative temporal sparing   Left  Eye - Average RNFL 42 global thinning     Normal macular architecture OU    OCT RNFL Today 4/23/225:   Right Eye - Average RNFL 42 global thinning   Left Eye - Average RNFL 46 global thinning     Macular architecture normal OU    Visual Field Test 24-2 OU 12/18/2024: Right Eye - fixation losses 0/13, false positives 1%, false negatives 15%, MD -20.50dB, Impression OD: dense constriction with relative ST and central sparing. Left Eye - fixation losses 0/14, false positives 0%, false negatives 7%, MD -21.66dB, Impression OS: patchy severe depression with some central sparing.  ?  Visual Field Test 24-2 OU Today 4/23/2025: Right Eye - fixation losses 0/13, false positives 2%, false negatives 15%, MD -16.82dB, Impression OD: dense inferior constriction with mild superior constriction. Left Eye - fixation losses 0/16, false positives 0%, false negatives 21%, MD -16.31dB, Impression OS: patchy severe depression.    Impression:  Justin Mcnally has history of HTN, HLD, erectile dysfunction, alcohol use who presents for evaluation of suspected optic neuropathy. They report slowly progressive blurred vision and especially difficulty with driving at night with glare. He had severe head trauma with subdural evacuation 30 years ago with no subjective changes in vision at that time. Neuro-ophthalmologic examination was notable for reduced visual acuities, impaired color vision OS>>OD, normal ocular motility and alignment. OCT with global peripapillary RNFL thinning OS<OD. Formal visual fields were notable for patchy constriction OS>OD with some central sparing. The pattern of visual field change is not very consistent with a nutritional or toxic optic neuropathy (should be more central) and would expect pattern of thinning to be more PMB. He thinks he had MRI in the last year or so but can't remember if it was of brain or contrast enhanced. He had CTA head and neck in 2018 with about 50% stenosis left ICA. I will repeat  neuroimaging and vessel imaging now and send optic neuropathy labs. 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    4/23/2025: Since last visit he had unremarkable optic neuropathy labs and normal CTA head and neck. Since last visit he also had cataract extraction OS and has not noticed any improvement in vision. His visual acuity OS was stable today but required refraction to 20/80. There is no evidence of any progression of optic neuropathy  ?  Plan:  1.  Follow up with Dr. Thakur as planned, distance glasses and separate reading glasses for optimal visual acuity    Follow-up:  I will see him in follow-up in 6 months or sooner with any change.  ?OCT and HVF  ?  Visit Checklist (as applicable):  1. Status of new and prior symptoms discussed? yes  2. Neuroimaging reviewed/ ordered as appropriate? yes  3. Ocular imaging and photos reviewed/ ordered as appropriate? yes  4. Plan for work-up and treatment discussed with patient? yes  5. Potential medication side-effects and monitoring plan discussed? N/a  6. Review of outside medical records was performed and pertinent details are summarized in the HPI above? yes    Time spent on this encounter: 45 minutes. This includes face to face time and non-face to face time preparing to see the patient (eg, review of tests), obtaining and/or reviewing separately obtained history, documenting clinical information in the electronic or other health record, independently interpreting results and communicating results to the patient/family/caregiver, or care coordinator.      AR Palma  Neuro-Ophthalmology Consultant